# Patient Record
(demographics unavailable — no encounter records)

---

## 2024-10-21 NOTE — PHYSICAL EXAM
[FreeTextEntry1] :  Physical Examination: General - Sitting up on ED cart Cardiovascular - No LE edema Eyes - Non-injected conjunctivae, anicteric sclerae  Neurologic Exam: Mental status: - Awake, Alert - Oriented to: person, place, and time - Speech: fluent - Repetition and naming: intact  - Follows simple and cross commands  - Fund of knowledge: intact  Cranial nerves - PERRL, VFF with finger counting, EOMI, face sensation (V1-V3) intact b/l, facial strength intact without asymmetry b/l, hearing grossly intact, palate with symmetric elevation, shoulder shrug intact b/l, tongue midline on protrusion with full lateral movement Dysarthria: not present  Motor - Normal bulk throughout. No pronator drift. Strength testing (R/L) Deltoid:  5/5 Biceps:  5/5       Triceps:  5/5      Wrist Extension:  5/5     Wrist Flexion:  5/5      Interossei:  5/5       :  5/5  Hip Flexion:  5/5 Knee Flexion:  5/5     Knee Extension:  5/5     Dorsiflexion:  5/5     Plantar Flexion:  5/5  Sensation - Light touch intact throughout  DTRs 2+ throughout  Coordination - FNF, HTS intact b/l. No tremors appreciated.  Gait and station - Unable to assess d/t fall risk/safety concerns.

## 2024-10-21 NOTE — DISCUSSION/SUMMARY
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 Etiology may be acute ischemic stroke vs. other (such as toxic effect of carbon monoxide). Mechanism if stroke: unknown but may be ESUS.  MRI head: focus of diffusion restriction as well as T2/FLAIR hyperintense signal within the right lateral splenium of the corpus callosum which may reflect an acute/subacute infarct with associated cytotoxic edema. No associated acute hemorrhage. A chronic lacunar infarct is seen within the left centrum semiovale. Additional chronic lacunar infarcts are seen within the bilateral basal  ganglia and left thalamus.  repeat MR w/wo in 1 month   Recommendations: [] Dysphagia screen at bedside per core measures [] SBP goal - OK for BP <130/80 (normotension) [] Can start aspirin 81mg daily if no medical contraindications [] Atorvastatin 80mg (goal LDL <70) [] Please obtain A1c, recent  [] TTE with bubble study can be done as an outpatient [] ILR placement as an outpatient [] Neurochecks, vitals q4h [] Fall and aspiration precautions [] Stroke education provided [] Smoking cessation education not needed - non-smoker [] PROMPT f/u with Vascular Neurology (Stroke) after discharge from [address], [address], [place] [address], [phone].   NO tenecteplase d/t outside therapeutic window. NO thrombectomy d/t no ELVO.  Patient/plan d/w Stroke fellow, Dr. Elizondo, under the supervision of attending vascular neurologist Dr. Pizarro. Recommendations were relayed directly to the ED provider. Note delayed d/t emergent patient care.  5.6 085

## 2024-10-21 NOTE — HISTORY OF PRESENT ILLNESS
[FreeTextEntry1] : Ms. 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 Hospitalized at [facility] since 9/3/2024, after a suicide attempt. MRI performed given report of personality changes. 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 Sent from [facility] to [facility] ED for stroke workup.  ED vitals notable for: afebrile, HR 80, /99, RR 18, SpO2 98% on RA. Labs notable for: , glucose 107, UTox negative for all drugs tested, UA with blood, WBCs, and bacteria. Exam notable for nonfocal neurologic exam.  NIHSS on admission: 0 LKN: Unknown pre-MRS: 0 to 1  MRI head: focus of diffusion restriction as well as T2/FLAIR hyperintense signal within the right lateral splenium of the corpus callosum which may reflect an acute/subacute infarct with associated cytotoxic edema. No associated acute hemorrhage. A chronic lacunar infarct is seen within the left centrum semiovale. Additional chronic lacunar infarcts are seen within the bilateral basal  ganglia and left thalamus.

## 2024-10-22 NOTE — HISTORY OF PRESENT ILLNESS
[FreeTextEntry1] : Ms. 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 Hospitalized at [facility] since 9/3/2024, after a suicide attempt. MRI performed given report of personality changes. 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 Sent from [facility] to [facility] ED for stroke workup.  ED vitals notable for: afebrile, HR 80, /99, RR 18, SpO2 98% on RA. Labs notable for: , glucose 107, UTox negative for all drugs tested, UA with blood, WBCs, and bacteria. Exam notable for nonfocal neurologic exam.  NIHSS on admission: 0 LKN: Unknown pre-MRS: 0 to 1  MRI head: focus of diffusion restriction as well as T2/FLAIR hyperintense signal within the right lateral splenium of the corpus callosum which may reflect an acute/subacute infarct with associated cytotoxic edema. No associated acute hemorrhage. A chronic lacunar infarct is seen within the left centrum semiovale. Additional chronic lacunar infarcts are seen within the bilateral basal  ganglia and left thalamus.

## 2025-01-08 NOTE — HISTORY OF PRESENT ILLNESS
[FreeTextEntry1] : Ms. 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 Hospitalized at [facility] since 9/3/2024, after a suicide attempt. MRI performed given report of personality changes. 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 Sent from [facility] to [facility] ED for stroke workup.  ED vitals notable for: afebrile, HR 80, /99, RR 18, SpO2 98% on RA. Labs notable for: , glucose 107, UTox negative for all drugs tested, UA with blood, WBCs, and bacteria. Exam notable for nonfocal neurologic exam.  NIHSS on admission: 0 LKN: Unknown pre-MRS: 0 to 1  MRI head: focus of diffusion restriction as well as T2/FLAIR hyperintense signal within the right lateral splenium of the corpus callosum which may reflect an acute/subacute infarct with associated cytotoxic edema. No associated acute hemorrhage. A chronic lacunar infarct is seen within the left centrum semiovale. Additional chronic lacunar infarcts are seen within the bilateral basal ganglia and left thalamus.

## 2025-01-08 NOTE — DISCUSSION/SUMMARY
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 Etiology may be acute ischemic stroke vs. other (such as toxic effect of carbon monoxide). Mechanism if stroke: unknown but may be ESUS.  MRI head: focus of diffusion restriction as well as T2/FLAIR hyperintense signal within the right lateral splenium of the corpus callosum which may reflect an acute/subacute infarct with associated cytotoxic edema. No associated acute hemorrhage. A chronic lacunar infarct is seen within the left centrum semiovale. Additional chronic lacunar infarcts are seen within the bilateral basal ganglia and left thalamus.  repeat MR w/wo in 1 month  Recommendations: [] Dysphagia screen at bedside per core measures [] SBP goal - OK for BP <130/80 (normotension) [] Can start aspirin 81mg daily if no medical contraindications [] Atorvastatin 80mg (goal LDL <70) [] Please obtain A1c, recent  [] TTE with bubble study can be done as an outpatient [] ILR placement as an outpatient [] Neurochecks, vitals q4h [] Fall and aspiration precautions [] Stroke education provided [] Smoking cessation education not needed - non-smoker [] PROMPT f/u with Vascular Neurology (Stroke) after discharge from [address], [address], [place] [address], [phone].  NO tenecteplase d/t outside therapeutic window. NO thrombectomy d/t no ELVO.  Patient/plan d/w Stroke fellow, Dr. Elizondo, under the supervision of attending vascular neurologist Dr. Pizarro. Recommendations were relayed directly to the ED provider. Note delayed d/t emergent patient care.  5.6 169.